# Patient Record
Sex: FEMALE | URBAN - METROPOLITAN AREA
[De-identification: names, ages, dates, MRNs, and addresses within clinical notes are randomized per-mention and may not be internally consistent; named-entity substitution may affect disease eponyms.]

---

## 2023-04-20 ENCOUNTER — OFFICE VISIT (OUTPATIENT)
Dept: URBAN - METROPOLITAN AREA CLINIC 92 | Facility: CLINIC | Age: 68
End: 2023-04-20

## 2023-04-21 ENCOUNTER — WEB ENCOUNTER (OUTPATIENT)
Dept: URBAN - METROPOLITAN AREA CLINIC 92 | Facility: CLINIC | Age: 68
End: 2023-04-21

## 2023-04-27 ENCOUNTER — OFFICE VISIT (OUTPATIENT)
Dept: URBAN - METROPOLITAN AREA CLINIC 92 | Facility: CLINIC | Age: 68
End: 2023-04-27

## 2023-04-27 RX ORDER — CHOLECALCIFEROL (VITAMIN D3) 25 MCG
TAKE 1 TABLET (1,000 UNITS TOTAL) BY MOUTH EVERY DAY TABLET ORAL
Qty: 90 EACH | Refills: 1 | Status: ACTIVE | COMMUNITY

## 2023-04-27 RX ORDER — LAMOTRIGINE 200 MG/1
TABLET ORAL
Qty: 180 TABLET | Status: ACTIVE | COMMUNITY

## 2023-06-27 ENCOUNTER — CLAIMS CREATED FROM THE CLAIM WINDOW (OUTPATIENT)
Dept: URBAN - METROPOLITAN AREA CLINIC 92 | Facility: CLINIC | Age: 68
End: 2023-06-27
Payer: COMMERCIAL

## 2023-06-27 ENCOUNTER — LAB OUTSIDE AN ENCOUNTER (OUTPATIENT)
Dept: URBAN - METROPOLITAN AREA CLINIC 92 | Facility: CLINIC | Age: 68
End: 2023-06-27

## 2023-06-27 VITALS
TEMPERATURE: 97 F | SYSTOLIC BLOOD PRESSURE: 131 MMHG | BODY MASS INDEX: 32.96 KG/M2 | HEIGHT: 67 IN | WEIGHT: 210 LBS | HEART RATE: 70 BPM | DIASTOLIC BLOOD PRESSURE: 80 MMHG

## 2023-06-27 DIAGNOSIS — G40.909 SEIZURE DISORDER: ICD-10-CM

## 2023-06-27 DIAGNOSIS — Z86.19 HISTORY OF HEPATITIS C: ICD-10-CM

## 2023-06-27 DIAGNOSIS — Z12.11 COLON CANCER SCREENING: ICD-10-CM

## 2023-06-27 DIAGNOSIS — Z86.19 HISTORY OF HEPATITIS B: ICD-10-CM

## 2023-06-27 PROBLEM — 128613002: Status: ACTIVE | Noted: 2023-06-27

## 2023-06-27 PROCEDURE — 99203 OFFICE O/P NEW LOW 30 MIN: CPT | Performed by: INTERNAL MEDICINE

## 2023-06-27 PROCEDURE — 993 AGA: Performed by: INTERNAL MEDICINE

## 2023-06-27 RX ORDER — LAMOTRIGINE 200 MG/1
TABLET ORAL
Qty: 180 TABLET | COMMUNITY

## 2023-06-27 RX ORDER — POLYETHYLENE GLYCOL 3350, SODIUM CHLORIDE, SODIUM BICARBONATE, POTASSIUM CHLORIDE 420; 11.2; 5.72; 1.48 G/4L; G/4L; G/4L; G/4L
4000 ML POWDER, FOR SOLUTION ORAL AS DIRECTED
Qty: 4000 ML | Refills: 0 | OUTPATIENT
Start: 2023-06-27 | End: 2023-06-28

## 2023-06-27 RX ORDER — ONDANSETRON 4 MG/1
2 TABLETS TABLET, FILM COATED ORAL
Qty: 2 TABLETS | Refills: 0 | OUTPATIENT
Start: 2023-06-27

## 2023-06-27 RX ORDER — CHOLECALCIFEROL (VITAMIN D3) 25 MCG
TAKE 1 TABLET (1,000 UNITS TOTAL) BY MOUTH EVERY DAY TABLET ORAL
Qty: 90 EACH | Refills: 1 | COMMUNITY

## 2023-06-27 NOTE — HPI-TODAY'S VISIT:
66 yo fem ref by Dr Khushi Garcia for a gi consultation for colon ca screening and a copy will be sent to the ref provider. Pt has never done had a colonoscopy done. Pt denies constipation- goes twice a day. Pt has Seizure disorder- every 3-5 years has one, sees neuro. Pt was treated for Hep C- by another doctor.

## 2023-07-07 ENCOUNTER — LAB OUTSIDE AN ENCOUNTER (OUTPATIENT)
Dept: URBAN - METROPOLITAN AREA CLINIC 86 | Facility: CLINIC | Age: 68
End: 2023-07-07

## 2023-07-07 ENCOUNTER — OFFICE VISIT (OUTPATIENT)
Dept: URBAN - METROPOLITAN AREA CLINIC 86 | Facility: CLINIC | Age: 68
End: 2023-07-07
Payer: COMMERCIAL

## 2023-07-07 VITALS
SYSTOLIC BLOOD PRESSURE: 124 MMHG | WEIGHT: 210 LBS | DIASTOLIC BLOOD PRESSURE: 80 MMHG | HEIGHT: 67 IN | BODY MASS INDEX: 32.96 KG/M2 | TEMPERATURE: 96.8 F | HEART RATE: 71 BPM

## 2023-07-07 DIAGNOSIS — Z79.899 HIGH RISK MEDICATION USE: ICD-10-CM

## 2023-07-07 DIAGNOSIS — B19.20 HEPATITIS C VIRUS INFECTION WITHOUT HEPATIC COMA, UNSPECIFIED CHRONICITY: ICD-10-CM

## 2023-07-07 PROBLEM — 50711007: Status: ACTIVE | Noted: 2023-07-07

## 2023-07-07 PROBLEM — 453861000124107: Status: ACTIVE | Noted: 2023-07-07

## 2023-07-07 PROBLEM — 443913008: Status: ACTIVE | Noted: 2023-07-07

## 2023-07-07 PROCEDURE — 99214 OFFICE O/P EST MOD 30 MIN: CPT | Performed by: PHYSICIAN ASSISTANT

## 2023-07-07 RX ORDER — ONDANSETRON 4 MG/1
2 TABLETS TABLET, FILM COATED ORAL
Qty: 2 TABLETS | Refills: 0 | Status: ACTIVE | COMMUNITY
Start: 2023-06-27

## 2023-07-07 RX ORDER — LAMOTRIGINE 200 MG/1
TABLET ORAL
Qty: 180 TABLET | COMMUNITY

## 2023-07-07 RX ORDER — CHOLECALCIFEROL (VITAMIN D3) 25 MCG
TAKE 1 TABLET (1,000 UNITS TOTAL) BY MOUTH EVERY DAY TABLET ORAL
Qty: 90 EACH | Refills: 1 | COMMUNITY

## 2023-07-07 NOTE — HPI-TODAY'S VISIT:
This is a 68 yo female previously seen by Dr. Kiera Black and referred by Dr. Khushi Montana presents for evaluation of history of hepatitis C. A copy of the note will be sent to the referring provider.  7/7/23 office visit  She was treated 10 years ago. She took [place] and was told she was in SVR.  She had an ultrasound done at the time and it was normal. She notes she was also told she had hep b but has not had this checked again

## 2023-07-12 ENCOUNTER — TELEPHONE ENCOUNTER (OUTPATIENT)
Dept: URBAN - METROPOLITAN AREA CLINIC 86 | Facility: CLINIC | Age: 68
End: 2023-07-12

## 2023-07-12 LAB
A/G RATIO: 1.6
ALBUMIN: 4.8
ALKALINE PHOSPHATASE: 71
ALT (SGPT): 10
AST (SGOT): 17
BASO (ABSOLUTE): 0
BASOS: 0
BILIRUBIN, TOTAL: 0.6
BUN/CREATININE RATIO: 12
BUN: 12
CALCIUM: 9.9
CARBON DIOXIDE, TOTAL: 21
CHLORIDE: 103
CREATININE: 1
EGFR: 63
EOS (ABSOLUTE): 0
EOS: 0
GLOBULIN, TOTAL: 3
GLUCOSE: 99
HBSAG SCREEN: NEGATIVE
HBV LOG10: (no result)
HCV LOG10: (no result)
HEMATOCRIT: 39.7
HEMATOLOGY COMMENTS:: (no result)
HEMOGLOBIN: 13.4
HEP A AB, TOTAL: POSITIVE
HEP B CORE AB, TOT: NEGATIVE
HEPATITIS B QUANTITATION: (no result)
HEPATITIS B SURF AB QUANT: <3.1
HEPATITIS C QUANTITATION: (no result)
IMMATURE CELLS: (no result)
IMMATURE GRANS (ABS): 0
IMMATURE GRANULOCYTES: 0
LYMPHS (ABSOLUTE): 1.5
LYMPHS: 45
MCH: 31.2
MCHC: 33.8
MCV: 92
MONOCYTES(ABSOLUTE): 0.4
MONOCYTES: 11
NEUTROPHILS (ABSOLUTE): 1.5
NEUTROPHILS: 44
NRBC: (no result)
PLATELETS: 327
POTASSIUM: 5.3
PROTEIN, TOTAL: 7.8
RBC: 4.3
RDW: 12.7
SODIUM: 141
TEST INFORMATION:: (no result)
TEST INFORMATION:: (no result)
WBC: 3.5

## 2023-07-12 NOTE — HPI-TODAY'S VISIT:
Dear Gisella Koo,   The July 7 labs were sent to me.  The hepatitis C virus was not detected.  Hepatitis B immunity was not seen.  Glucose 99, creatinine 1.0, sodium 141, potassium 5.3 and this is slightly elevated and sometimes this can happen if the blood sample is shaken but I would still recommend rechecking this with the primary care.  The bilirubin 0.6, alkaline phosphatase 71, AST 17, ALT 10.  Goal for the AST and ALT is less than 25.  The hepatitis B virus is not detected.  The complete blood count was normal.  The hepatitis B core was not detected either.  You have immunity to hepatitis A.  Overall there is no evidence of any virus impacting the liver, but they did not see any immunity to the hepatitis B virus and I would recommend discussing this vaccine with the primary care.  We will see what the ultrasound shows and discuss this at the follow-up.  Estrella Andrews PA-C

## 2023-08-03 ENCOUNTER — OFFICE VISIT (OUTPATIENT)
Dept: URBAN - METROPOLITAN AREA CLINIC 16 | Facility: CLINIC | Age: 68
End: 2023-08-03
Payer: COMMERCIAL

## 2023-08-03 DIAGNOSIS — R93.2 ABNORMAL ULTRASOUND OF LIVER: ICD-10-CM

## 2023-08-03 PROCEDURE — 76705 ECHO EXAM OF ABDOMEN: CPT

## 2023-08-03 PROCEDURE — 93975 VASCULAR STUDY: CPT

## 2023-08-15 ENCOUNTER — TELEPHONE ENCOUNTER (OUTPATIENT)
Dept: URBAN - METROPOLITAN AREA CLINIC 86 | Facility: CLINIC | Age: 68
End: 2023-08-15

## 2023-08-15 NOTE — HPI-TODAY'S VISIT:
Dear Gisella Koo,  The August 3 ultrasound was sent to me.  The liver appeared increased in echogenicity but they did not see any lesions.  The gallbladder appeared normal.  Common bile duct was 3 mm and this is normal.  The hepatic vasculature was patent.  Overall they thought that the echogenicity was increased which can be seen in fatty liver and chronic liver disease.  We will review this at the follow up.  Estrella Andrews PA-C

## 2023-08-31 ENCOUNTER — OFFICE VISIT (OUTPATIENT)
Dept: URBAN - METROPOLITAN AREA CLINIC 86 | Facility: CLINIC | Age: 68
End: 2023-08-31
Payer: COMMERCIAL

## 2023-08-31 VITALS
BODY MASS INDEX: 32.18 KG/M2 | WEIGHT: 205 LBS | SYSTOLIC BLOOD PRESSURE: 121 MMHG | TEMPERATURE: 96.8 F | HEIGHT: 67 IN | HEART RATE: 73 BPM | DIASTOLIC BLOOD PRESSURE: 72 MMHG

## 2023-08-31 DIAGNOSIS — B19.20 HEPATITIS C VIRUS INFECTION WITHOUT HEPATIC COMA, UNSPECIFIED CHRONICITY: ICD-10-CM

## 2023-08-31 DIAGNOSIS — Z79.899 HIGH RISK MEDICATION USE: ICD-10-CM

## 2023-08-31 DIAGNOSIS — K76.0 FATTY LIVER: ICD-10-CM

## 2023-08-31 DIAGNOSIS — Z78.9 HEPATITIS B CORE ANTIBODY NEGATIVE: ICD-10-CM

## 2023-08-31 DIAGNOSIS — Z71.85 VACCINE COUNSELING: ICD-10-CM

## 2023-08-31 PROBLEM — 197321007: Status: ACTIVE | Noted: 2023-08-31

## 2023-08-31 PROCEDURE — 99214 OFFICE O/P EST MOD 30 MIN: CPT | Performed by: PHYSICIAN ASSISTANT

## 2023-08-31 RX ORDER — ONDANSETRON 4 MG/1
2 TABLETS TABLET, FILM COATED ORAL
Qty: 2 TABLETS | Refills: 0 | Status: ACTIVE | COMMUNITY
Start: 2023-06-27

## 2023-08-31 RX ORDER — LAMOTRIGINE 200 MG/1
TABLET ORAL
Qty: 180 TABLET | COMMUNITY

## 2023-08-31 RX ORDER — CHOLECALCIFEROL (VITAMIN D3) 25 MCG
TAKE 1 TABLET (1,000 UNITS TOTAL) BY MOUTH EVERY DAY TABLET ORAL
Qty: 90 EACH | Refills: 1 | COMMUNITY

## 2023-08-31 NOTE — HPI-TODAY'S VISIT:
This is a 68 yo female previously seen by Dr. Kiera Black and referred by Dr. Khushi Montana presents for evaluation of history of hepatitis C. A copy of the note will be sent to the referring provider.  8/31/23 ov 8/3/23 us The August 3 ultrasound was sent to me.  The liver appeared increased in echogenicity but they did not see any lesions.  The gallbladder appeared normal.  Common bile duct was 3 mm and this is normal.  The hepatic vasculature was patent.  Overall they thought that the echogenicity was increased which can be seen in fatty liver and chronic liver disease.  We will review this at the follow up. 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 at last visit she was 210 and happy to see this she is walking and cut out sugar.  7/7/23 labs  The hepatitis C virus was not detected.  Hepatitis B immunity was not seen.  Glucose 99, creatinine 1.0, sodium 141,   The bilirubin 0.6, alkaline phosphatase 71, AST 17, ALT 10.  Goal for the AST and ALT is less than 25.  The hepatitis B virus is not detected.  The complete blood count was normal.  The hepatitis B core was not detected either.  You have immunity to hepatitis A.    7/7/23 office visit  She was treated 10 years ago. She took [place] and was told she was in SVR.  She had an ultrasound done at the time and it was normal. She notes she was also told she had hep b but has not had this checked again

## 2024-01-29 ENCOUNTER — LAB OUTSIDE AN ENCOUNTER (OUTPATIENT)
Dept: URBAN - METROPOLITAN AREA CLINIC 86 | Facility: CLINIC | Age: 69
End: 2024-01-29

## 2024-01-30 ENCOUNTER — OFFICE VISIT (OUTPATIENT)
Dept: URBAN - METROPOLITAN AREA CLINIC 91 | Facility: CLINIC | Age: 69
End: 2024-01-30

## 2024-01-30 ENCOUNTER — OFFICE VISIT (OUTPATIENT)
Dept: URBAN - METROPOLITAN AREA CLINIC 86 | Facility: CLINIC | Age: 69
End: 2024-01-30

## 2024-02-05 ENCOUNTER — LAB (OUTPATIENT)
Dept: URBAN - METROPOLITAN AREA CLINIC 86 | Facility: CLINIC | Age: 69
End: 2024-02-05

## 2024-02-06 ENCOUNTER — US W/ DOPP (OUTPATIENT)
Dept: URBAN - METROPOLITAN AREA CLINIC 91 | Facility: CLINIC | Age: 69
End: 2024-02-06

## 2024-02-06 ENCOUNTER — OV EP (OUTPATIENT)
Dept: URBAN - METROPOLITAN AREA CLINIC 86 | Facility: CLINIC | Age: 69
End: 2024-02-06
Payer: COMMERCIAL

## 2024-02-06 VITALS
HEIGHT: 67 IN | WEIGHT: 212 LBS | TEMPERATURE: 97.2 F | SYSTOLIC BLOOD PRESSURE: 112 MMHG | BODY MASS INDEX: 33.27 KG/M2 | HEART RATE: 68 BPM | DIASTOLIC BLOOD PRESSURE: 71 MMHG

## 2024-02-06 DIAGNOSIS — K76.0 FATTY LIVER: ICD-10-CM

## 2024-02-06 DIAGNOSIS — Z79.899 HIGH RISK MEDICATION USE: ICD-10-CM

## 2024-02-06 DIAGNOSIS — Z78.9 HEPATITIS B CORE ANTIBODY NEGATIVE: ICD-10-CM

## 2024-02-06 DIAGNOSIS — B19.20 HEPATITIS C VIRUS INFECTION WITHOUT HEPATIC COMA, UNSPECIFIED CHRONICITY: ICD-10-CM

## 2024-02-06 DIAGNOSIS — Z71.85 VACCINE COUNSELING: ICD-10-CM

## 2024-02-06 PROBLEM — 736693005: Status: ACTIVE | Noted: 2024-02-06

## 2024-02-06 PROCEDURE — 99214 OFFICE O/P EST MOD 30 MIN: CPT | Performed by: PHYSICIAN ASSISTANT

## 2024-02-06 RX ORDER — ONDANSETRON 4 MG/1
2 TABLETS TABLET, FILM COATED ORAL
Qty: 2 TABLETS | Refills: 0 | Status: ON HOLD | COMMUNITY
Start: 2023-06-27

## 2024-02-06 RX ORDER — CHOLECALCIFEROL (VITAMIN D3) 25 MCG
TAKE 1 TABLET (1,000 UNITS TOTAL) BY MOUTH EVERY DAY TABLET ORAL
Qty: 90 EACH | Refills: 1 | Status: ACTIVE | COMMUNITY

## 2024-02-06 RX ORDER — ROSUVASTATIN CALCIUM 5 MG/1
1 TABLET TABLET, FILM COATED ORAL ONCE A DAY
Status: ACTIVE | COMMUNITY

## 2024-02-06 RX ORDER — LAMOTRIGINE 200 MG/1
TABLET ORAL
Qty: 180 TABLET | Status: ACTIVE | COMMUNITY

## 2024-02-06 NOTE — HPI-TODAY'S VISIT:
This is a 68 yo female previously seen by Dr. Kiera Black and referred by Dr. Khushi Montana presents for evaluation of history of hepatitis C. A copy of the note will be sent to the referring provider.   2/6/24 ov  She was to do the US today but had to r/s because technician was sick. We will get her rescheduled.  SHe has not done the labs. We can order those today.  Did see labs in [facility] October 30, 2023 labs with white blood cells 4.8, hemoglobin 12.7, MCV 94, platelets 328 and this is normal.  Sodium 139, potassium 4.2, creatinine 0.9, bilirubin 0.5, AST 23, ALT 10. Her weight is up and discussed this. 212, and previously 205.  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   recap 8/31/23 ov 8/3/23 us The August 3 ultrasound was sent to me.  The liver appeared increased in echogenicity but they did not see any lesions.  The gallbladder appeared normal.  Common bile duct was 3 mm and this is normal.  The hepatic vasculature was patent.  Overall they thought that the echogenicity was increased which can be seen in fatty liver and chronic liver disease.  We will review this at the follow up. 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 at last visit she was 210 and happy to see this she is walking and cut out sugar.  7/7/23 labs  The hepatitis C virus was not detected.  Hepatitis B immunity was not seen.  Glucose 99, creatinine 1.0, sodium 141,   The bilirubin 0.6, alkaline phosphatase 71, AST 17, ALT 10.  Goal for the AST and ALT is less than 25.  The hepatitis B virus is not detected.  The complete blood count was normal.  The hepatitis B core was not detected either.  You have immunity to hepatitis A.    7/7/23 office visit  She was treated 10 years ago. She took [place] and was told she was in SVR.  She had an ultrasound done at the time and it was normal. She notes she was also told she had hep b but has not had this checked again

## 2024-02-06 NOTE — PHYSICAL EXAM SKIN:
Problem: Pain, Acute (Adult)  Goal: Identify Related Risk Factors and Signs and Symptoms  Outcome: Ongoing (interventions implemented as appropriate)    02/04/17 2214   Pain, Acute   Related Risk Factors (Acute Pain) infection   Signs and Symptoms (Acute Pain) BADLs/IADLs reluctance/inability to perform;verbalization of pain descriptors       Goal: Acceptable Pain Control/Comfort Level  Outcome: Ongoing (interventions implemented as appropriate)    Problem: Cellulitis (Adult)  Goal: Signs and Symptoms of Listed Potential Problems Will be Absent or Manageable (Cellulitis)  Outcome: Ongoing (interventions implemented as appropriate)    02/05/17 1022   Cellulitis   Problems Assessed (Cellulitis) all   Problems Present (Cellulitis) pain         Problem: Patient Care Overview (Adult)  Goal: Plan of Care Review  Outcome: Ongoing (interventions implemented as appropriate)    02/05/17 1022   Coping/Psychosocial Response Interventions   Plan Of Care Reviewed With patient   Patient Care Overview   Progress progress toward functional goals as expected       Goal: Adult Individualization and Mutuality  Outcome: Ongoing (interventions implemented as appropriate)  Goal: Discharge Needs Assessment  Outcome: Ongoing (interventions implemented as appropriate)       no rashes

## 2024-02-09 LAB
A/G RATIO: 1.6
ALBUMIN: 4.6
ALKALINE PHOSPHATASE: 52
ALT (SGPT): 13
AST (SGOT): 25
BASO (ABSOLUTE): 0
BASOS: 1
BILIRUBIN, TOTAL: 0.5
BUN/CREATININE RATIO: 13
BUN: 14
CALCIUM: 9.8
CARBON DIOXIDE, TOTAL: 24
CHLORIDE: 101
CREATININE: 1.06
EGFR: 58
EOS (ABSOLUTE): 0
EOS: 0
GLOBULIN, TOTAL: 2.9
GLUCOSE: 91
HCV LOG10: (no result)
HEMATOCRIT: 37.8
HEMATOLOGY COMMENTS:: (no result)
HEMOGLOBIN: 12.6
HEPATITIS C QUANTITATION: (no result)
IMMATURE CELLS: (no result)
IMMATURE GRANS (ABS): 0
IMMATURE GRANULOCYTES: 0
LYMPHS (ABSOLUTE): 1.7
LYMPHS: 44
MCH: 30.6
MCHC: 33.3
MCV: 92
MONOCYTES(ABSOLUTE): 0.4
MONOCYTES: 10
NEUTROPHILS (ABSOLUTE): 1.7
NEUTROPHILS: 45
NRBC: (no result)
PLATELETS: 315
POTASSIUM: 4.8
PROTEIN, TOTAL: 7.5
RBC: 4.12
RDW: 12.9
SODIUM: 141
TEST INFORMATION:: (no result)
WBC: 3.7

## 2024-02-23 ENCOUNTER — US W/ DOPP (OUTPATIENT)
Dept: URBAN - METROPOLITAN AREA CLINIC 91 | Facility: CLINIC | Age: 69
End: 2024-02-23
Payer: COMMERCIAL

## 2024-02-23 DIAGNOSIS — B17.10 HEPATITIS C: ICD-10-CM

## 2024-02-23 PROCEDURE — 76705 ECHO EXAM OF ABDOMEN: CPT

## 2024-02-23 PROCEDURE — 93975 VASCULAR STUDY: CPT

## 2024-08-01 ENCOUNTER — LAB OUTSIDE AN ENCOUNTER (OUTPATIENT)
Dept: URBAN - METROPOLITAN AREA CLINIC 86 | Facility: CLINIC | Age: 69
End: 2024-08-01

## 2024-08-07 ENCOUNTER — LAB OUTSIDE AN ENCOUNTER (OUTPATIENT)
Dept: URBAN - METROPOLITAN AREA CLINIC 86 | Facility: CLINIC | Age: 69
End: 2024-08-07

## 2024-08-23 ENCOUNTER — OFFICE VISIT (OUTPATIENT)
Dept: URBAN - METROPOLITAN AREA CLINIC 91 | Facility: CLINIC | Age: 69
End: 2024-08-23
Payer: COMMERCIAL

## 2024-08-23 ENCOUNTER — OFFICE VISIT (OUTPATIENT)
Dept: URBAN - METROPOLITAN AREA CLINIC 86 | Facility: CLINIC | Age: 69
End: 2024-08-23
Payer: COMMERCIAL

## 2024-08-23 VITALS
BODY MASS INDEX: 33.74 KG/M2 | DIASTOLIC BLOOD PRESSURE: 69 MMHG | WEIGHT: 215 LBS | TEMPERATURE: 97.1 F | HEIGHT: 67 IN | HEART RATE: 69 BPM | SYSTOLIC BLOOD PRESSURE: 113 MMHG

## 2024-08-23 DIAGNOSIS — B19.20 HEPATITIS C VIRUS INFECTION WITHOUT HEPATIC COMA, UNSPECIFIED CHRONICITY: ICD-10-CM

## 2024-08-23 DIAGNOSIS — Z79.899 HIGH RISK MEDICATION USE: ICD-10-CM

## 2024-08-23 DIAGNOSIS — K76.0 FATTY (CHANGE OF) LIVER: ICD-10-CM

## 2024-08-23 DIAGNOSIS — Z78.9 HEPATITIS B CORE ANTIBODY NEGATIVE: ICD-10-CM

## 2024-08-23 DIAGNOSIS — K76.0 FATTY LIVER: ICD-10-CM

## 2024-08-23 DIAGNOSIS — B18.2 HEP C W/O COMA, CHRONIC: ICD-10-CM

## 2024-08-23 PROCEDURE — 99214 OFFICE O/P EST MOD 30 MIN: CPT | Performed by: PHYSICIAN ASSISTANT

## 2024-08-23 PROCEDURE — 76705 ECHO EXAM OF ABDOMEN: CPT

## 2024-08-23 PROCEDURE — 93975 VASCULAR STUDY: CPT

## 2024-08-23 RX ORDER — CHOLECALCIFEROL (VITAMIN D3) 25 MCG
TAKE 1 TABLET (1,000 UNITS TOTAL) BY MOUTH EVERY DAY TABLET ORAL
Qty: 90 EACH | Refills: 1 | Status: ACTIVE | COMMUNITY

## 2024-08-23 RX ORDER — ONDANSETRON 4 MG/1
2 TABLETS TABLET, FILM COATED ORAL
Qty: 2 TABLETS | Refills: 0 | Status: ON HOLD | COMMUNITY
Start: 2023-06-27

## 2024-08-23 RX ORDER — LAMOTRIGINE 200 MG/1
TABLET ORAL
Qty: 180 TABLET | Status: ACTIVE | COMMUNITY

## 2024-08-23 RX ORDER — ROSUVASTATIN CALCIUM 5 MG/1
1 TABLET TABLET, FILM COATED ORAL ONCE A DAY
Status: ACTIVE | COMMUNITY

## 2024-08-23 NOTE — HPI-TODAY'S VISIT:
This is a 66 yo female previously seen by Dr. Kiera Black and referred by Dr. Khushi Montana presents for evaluation of history of hepatitis C. A copy of the note will be sent to the referring provider.  8/23/24 She is doing the us next and will follow up on the report  she had labs in march again and all normal.  sees her primary next week and wants to tod the labs with them doing well overall     recap The 2/23/24 ultrasound was sent to me. The liver was normal in echogenicity and they did not see any lesions. They did not see any dilated intrahepatic biliary varices. The pancreas normal were seen. Right kidney 10.9 cm and appears normal. Spleen 8.6 mm. The hepatic vascular patent. Overall they thought that the liver appeared normal and the hepatic vasculature was patent which is good to note.  Estrella Andrews PA-C  Dear Gisella Koo, The recent February 6 labs were sent to me.  The hepatitis C was not detected.  The creatinine slightly elevated 1.06, questions with your primary care.  Previously back in July of this year has been normal.  Sodium 141, potassium 4.8, bilirubin 0.5, alkaline phosphatase 52, AST 25, ALT 13.  Goal for the AST and ALT is less than 25.  To this is borderline.  Be sure to work on her diet and exercise and this will help with the labs.  Complete blood count was normal.  The white blood cells 3.7, hemoglobin 12.6, MCV 92, platelets 215.  It was good seeing you the other day. Estrella Andrews PA-C  2/6/24 ov  She was to do the US today but had to r/s because technician was sick. We will get her rescheduled.  SHe has not done the labs. We can order those today.  Did see labs in [facility] October 30, 2023 labs with white blood cells 4.8, hemoglobin 12.7, MCV 94, platelets 328 and this is normal.  Sodium 139, potassium 4.2, creatinine 0.9, bilirubin 0.5, AST 23, ALT 10. Her weight is up and discussed this. 212, and previously 205.  She said her sister had hip surgery and was caring for her in [place] x 3 weeks and having bad food and that could have contributed to the weight.   recap 8/31/23 ov 8/3/23 us The August 3 ultrasound was sent to me.  The liver appeared increased in echogenicity but they did not see any lesions.  The gallbladder appeared normal.  Common bile duct was 3 mm and this is normal.  The hepatic vasculature was patent.  Overall they thought that the echogenicity was increased which can be seen in fatty liver and chronic liver disease.  We will review this at the follow up. 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 at last visit she was 210 and happy to see this she is walking and cut out sugar.  7/7/23 labs  The hepatitis C virus was not detected.  Hepatitis B immunity was not seen.  Glucose 99, creatinine 1.0, sodium 141,   The bilirubin 0.6, alkaline phosphatase 71, AST 17, ALT 10.  Goal for the AST and ALT is less than 25.  The hepatitis B virus is not detected.  The complete blood count was normal.  The hepatitis B core was not detected either.  You have immunity to hepatitis A.    7/7/23 office visit  She was treated 10 years ago. She took [place] and was told she was in SVR.  She had an ultrasound done at the time and it was normal. She notes she was also told she had hep b but has not had this checked again

## 2024-08-28 ENCOUNTER — TELEPHONE ENCOUNTER (OUTPATIENT)
Dept: URBAN - METROPOLITAN AREA CLINIC 86 | Facility: CLINIC | Age: 69
End: 2024-08-28

## 2024-09-18 ENCOUNTER — LAB OUTSIDE AN ENCOUNTER (OUTPATIENT)
Dept: URBAN - METROPOLITAN AREA CLINIC 92 | Facility: CLINIC | Age: 69
End: 2024-09-18

## 2024-09-18 ENCOUNTER — OFFICE VISIT (OUTPATIENT)
Dept: URBAN - METROPOLITAN AREA CLINIC 92 | Facility: CLINIC | Age: 69
End: 2024-09-18
Payer: COMMERCIAL

## 2024-09-18 ENCOUNTER — DASHBOARD ENCOUNTERS (OUTPATIENT)
Age: 69
End: 2024-09-18

## 2024-09-18 VITALS
DIASTOLIC BLOOD PRESSURE: 86 MMHG | BODY MASS INDEX: 34.01 KG/M2 | TEMPERATURE: 97.9 F | HEIGHT: 67 IN | WEIGHT: 216.7 LBS | HEART RATE: 68 BPM | SYSTOLIC BLOOD PRESSURE: 112 MMHG

## 2024-09-18 DIAGNOSIS — Z12.11 COLON CANCER SCREENING: ICD-10-CM

## 2024-09-18 PROCEDURE — 99202 OFFICE O/P NEW SF 15 MIN: CPT | Performed by: INTERNAL MEDICINE

## 2024-09-18 PROCEDURE — 99212 OFFICE O/P EST SF 10 MIN: CPT | Performed by: INTERNAL MEDICINE

## 2024-09-18 RX ORDER — CHOLECALCIFEROL (VITAMIN D3) 25 MCG
TAKE 1 TABLET (1,000 UNITS TOTAL) BY MOUTH EVERY DAY TABLET ORAL
Qty: 90 EACH | Refills: 1 | Status: ACTIVE | COMMUNITY

## 2024-09-18 RX ORDER — ROSUVASTATIN CALCIUM 5 MG/1
1 TABLET TABLET, FILM COATED ORAL ONCE A DAY
Status: ACTIVE | COMMUNITY

## 2024-09-18 RX ORDER — ONDANSETRON 4 MG/1
2 TABLETS TABLET, FILM COATED ORAL
Qty: 2 TABLETS | Refills: 0 | Status: ACTIVE | COMMUNITY
Start: 2023-06-27

## 2024-09-18 RX ORDER — LAMOTRIGINE 200 MG/1
TABLET ORAL
Qty: 180 TABLET | Status: ACTIVE | COMMUNITY

## 2024-09-18 NOTE — HPI-TODAY'S VISIT:
68yF with a hx of seizures (last in Mar 2024), HCV s/p tx and SVR, who p/w CRC screen. No e/o cirrhosis on imaging, follows with Liver Team. No prior colon, no blood in stool, no fam hx of CRC.

## 2024-09-20 ENCOUNTER — TELEPHONE ENCOUNTER (OUTPATIENT)
Dept: URBAN - METROPOLITAN AREA CLINIC 92 | Facility: CLINIC | Age: 69
End: 2024-09-20

## 2024-09-20 RX ORDER — POLYETHYLENE GLYCOL-3350 AND ELECTROLYTES WITH FLAVOR PACK 240; 5.84; 2.98; 6.72; 22.72 G/278.26G; G/278.26G; G/278.26G; G/278.26G; G/278.26G
4000 ML POWDER, FOR SOLUTION ORAL ONCE
Qty: 4000 ML | Refills: 0 | OUTPATIENT
Start: 2024-09-20

## 2024-11-07 ENCOUNTER — OFFICE VISIT (OUTPATIENT)
Dept: URBAN - METROPOLITAN AREA SURGERY CENTER 16 | Facility: SURGERY CENTER | Age: 69
End: 2024-11-07
Payer: COMMERCIAL

## 2024-11-07 DIAGNOSIS — Z12.11 COLON CANCER SCREENING: ICD-10-CM

## 2024-11-07 DIAGNOSIS — Z12.11 COLON CANCER SCREENING (HIGH RISK): ICD-10-CM

## 2024-11-07 DIAGNOSIS — K57.30 DIVERTICULOSIS OF SIGMOID COLON: ICD-10-CM

## 2024-11-07 DIAGNOSIS — K64.8 HEMORRHOIDS, INTERNAL. NON BLEEDING,: ICD-10-CM

## 2024-11-07 PROCEDURE — 0528F RCMND FLW-UP 10 YRS DOCD: CPT | Performed by: INTERNAL MEDICINE

## 2024-11-07 PROCEDURE — G0121 COLON CA SCRN NOT HI RSK IND: HCPCS | Performed by: INTERNAL MEDICINE

## 2024-11-07 PROCEDURE — 00812 ANES LWR INTST SCR COLSC: CPT | Performed by: ANESTHESIOLOGY

## 2024-11-07 PROCEDURE — 00812 ANES LWR INTST SCR COLSC: CPT | Performed by: ANESTHESIOLOGIST ASSISTANT

## 2024-11-07 RX ORDER — LAMOTRIGINE 200 MG/1
TABLET ORAL
Qty: 180 TABLET | Status: ACTIVE | COMMUNITY

## 2024-11-07 RX ORDER — CHOLECALCIFEROL (VITAMIN D3) 25 MCG
TAKE 1 TABLET (1,000 UNITS TOTAL) BY MOUTH EVERY DAY TABLET ORAL
Qty: 90 EACH | Refills: 1 | Status: ACTIVE | COMMUNITY

## 2024-11-07 RX ORDER — ROSUVASTATIN CALCIUM 5 MG/1
1 TABLET TABLET, FILM COATED ORAL ONCE A DAY
Status: ACTIVE | COMMUNITY

## 2024-11-07 RX ORDER — POLYETHYLENE GLYCOL-3350 AND ELECTROLYTES WITH FLAVOR PACK 240; 5.84; 2.98; 6.72; 22.72 G/278.26G; G/278.26G; G/278.26G; G/278.26G; G/278.26G
4000 ML POWDER, FOR SOLUTION ORAL ONCE
Qty: 4000 ML | Refills: 0 | Status: ACTIVE | COMMUNITY
Start: 2024-09-20

## 2024-11-07 RX ORDER — ONDANSETRON 4 MG/1
2 TABLETS TABLET, FILM COATED ORAL
Qty: 2 TABLETS | Refills: 0 | Status: ACTIVE | COMMUNITY
Start: 2023-06-27

## 2025-02-03 ENCOUNTER — LAB OUTSIDE AN ENCOUNTER (OUTPATIENT)
Dept: URBAN - METROPOLITAN AREA CLINIC 86 | Facility: CLINIC | Age: 70
End: 2025-02-03

## 2025-02-24 ENCOUNTER — OFFICE VISIT (OUTPATIENT)
Dept: URBAN - METROPOLITAN AREA CLINIC 86 | Facility: CLINIC | Age: 70
End: 2025-02-24
Payer: COMMERCIAL

## 2025-02-24 ENCOUNTER — OFFICE VISIT (OUTPATIENT)
Dept: URBAN - METROPOLITAN AREA CLINIC 91 | Facility: CLINIC | Age: 70
End: 2025-02-24
Payer: COMMERCIAL

## 2025-02-24 VITALS
WEIGHT: 218 LBS | TEMPERATURE: 97.2 F | BODY MASS INDEX: 34.21 KG/M2 | DIASTOLIC BLOOD PRESSURE: 77 MMHG | HEIGHT: 67 IN | HEART RATE: 69 BPM | SYSTOLIC BLOOD PRESSURE: 126 MMHG

## 2025-02-24 DIAGNOSIS — B19.20 HEPATITIS C VIRUS INFECTION WITHOUT HEPATIC COMA, UNSPECIFIED CHRONICITY: ICD-10-CM

## 2025-02-24 DIAGNOSIS — Z71.85 VACCINE COUNSELING: ICD-10-CM

## 2025-02-24 DIAGNOSIS — Z78.9 HEPATITIS B CORE ANTIBODY NEGATIVE: ICD-10-CM

## 2025-02-24 DIAGNOSIS — K76.0 FATTY LIVER: ICD-10-CM

## 2025-02-24 DIAGNOSIS — Z79.899 HIGH RISK MEDICATION USE: ICD-10-CM

## 2025-02-24 PROCEDURE — 99214 OFFICE O/P EST MOD 30 MIN: CPT | Performed by: PHYSICIAN ASSISTANT

## 2025-02-24 PROCEDURE — 76705 ECHO EXAM OF ABDOMEN: CPT

## 2025-02-24 PROCEDURE — 93975 VASCULAR STUDY: CPT

## 2025-02-24 RX ORDER — MAGNESIUM 200 MG
2 TABLETS WITH A MEAL TABLET ORAL ONCE A DAY
Status: ACTIVE | COMMUNITY

## 2025-02-24 RX ORDER — HYDROCHLOROTHIAZIDE 12.5 MG/1
1 CAPSULE IN THE MORNING CAPSULE, GELATIN COATED ORAL ONCE A DAY
Status: ACTIVE | COMMUNITY

## 2025-02-24 RX ORDER — ROSUVASTATIN CALCIUM 5 MG/1
1 TABLET TABLET, FILM COATED ORAL ONCE A DAY
Status: ON HOLD | COMMUNITY

## 2025-02-24 RX ORDER — LAMOTRIGINE 200 MG/1
TABLET ORAL
Qty: 180 TABLET | Status: ACTIVE | COMMUNITY

## 2025-02-24 RX ORDER — CHOLECALCIFEROL (VITAMIN D3) 25 MCG
TAKE 1 TABLET (1,000 UNITS TOTAL) BY MOUTH EVERY DAY TABLET ORAL
Qty: 90 EACH | Refills: 1 | Status: ACTIVE | COMMUNITY

## 2025-02-24 RX ORDER — ONDANSETRON 4 MG/1
2 TABLETS TABLET, FILM COATED ORAL
Qty: 2 TABLETS | Refills: 0 | Status: ACTIVE | COMMUNITY
Start: 2023-06-27

## 2025-02-24 RX ORDER — POLYETHYLENE GLYCOL-3350 AND ELECTROLYTES WITH FLAVOR PACK 240; 5.84; 2.98; 6.72; 22.72 G/278.26G; G/278.26G; G/278.26G; G/278.26G; G/278.26G
4000 ML POWDER, FOR SOLUTION ORAL ONCE
Qty: 4000 ML | Refills: 0 | Status: ACTIVE | COMMUNITY
Start: 2024-09-20

## 2025-02-24 RX ORDER — ROSUVASTATIN CALCIUM 5 MG/1
1 TABLET TABLET, COATED ORAL ONCE A DAY
Status: ACTIVE | COMMUNITY

## 2025-02-24 NOTE — HPI-TODAY'S VISIT:
This is a 66 yo female previously seen by Dr. Kiera Black and referred by Dr. Khushi Montana presents for evaluation of history of hepatitis C. A copy of the note will be sent to the referring provider.    2/24/25 she had US today and preliminary showing normal liver  need to check the labs  she notes her seizures    recap Dear Gisella Koo,   The recent 8/23/24 ultrasound was sent to me.  The liver appeared homogeneous in echotexture and low-density lesions.  The common bile duct was 4 mm and this was normal.  The gallbladder appeared normal.  The right kidney also appeared normal.  The hepatic vasculature appeared patent the spleen 7.5 cm and this is normal.  Overall they felt this is a normal ultrasound which is good to note.  Estrella Andrews PA-C  8/23/24 She is doing the us next and will follow up on the report  she had labs in march again and all normal.  sees her primary next week and wants to tod the labs with them doing well overall     recap The 2/23/24 ultrasound was sent to me. The liver was normal in echogenicity and they did not see any lesions. They did not see any dilated intrahepatic biliary varices. The pancreas normal were seen. Right kidney 10.9 cm and appears normal. Spleen 8.6 mm. The hepatic vascular patent. Overall they thought that the liver appeared normal and the hepatic vasculature was patent which is good to note.  Estrelal Andrews PA-C  Dear Gisella Koo, The recent February 6 labs were sent to me.  The hepatitis C was not detected.  The creatinine slightly elevated 1.06, questions with your primary care.  Previously back in July of this year has been normal.  Sodium 141, potassium 4.8, bilirubin 0.5, alkaline phosphatase 52, AST 25, ALT 13.  Goal for the AST and ALT is less than 25.  To this is borderline.  Be sure to work on her diet and exercise and this will help with the labs.  Complete blood count was normal.  The white blood cells 3.7, hemoglobin 12.6, MCV 92, platelets 215.  It was good seeing you the other day. Estrella Andrews PA-C  2/6/24 ov  She was to do the US today but had to r/s because technician was sick. We will get her rescheduled.  SHe has not done the labs. We can order those today.  Did see labs in [facility] October 30, 2023 labs with white blood cells 4.8, hemoglobin 12.7, MCV 94, platelets 328 and this is normal.  Sodium 139, potassium 4.2, creatinine 0.9, bilirubin 0.5, AST 23, ALT 10. Her weight is up and discussed this. 212, and previously 205.  She said her sister had hip surgery and was caring for her in [place] x 3 weeks and having bad food and that could have contributed to the weight.   recap 8/31/23 ov 8/3/23 us The August 3 ultrasound was sent to me.  The liver appeared increased in echogenicity but they did not see any lesions.  The gallbladder appeared normal.  Common bile duct was 3 mm and this is normal.  The hepatic vasculature was patent.  Overall they thought that the echogenicity was increased which can be seen in fatty liver and chronic liver disease.  We will review this at the follow up. 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 at last visit she was 210 and happy to see this she is walking and cut out sugar.  7/7/23 labs  The hepatitis C virus was not detected.  Hepatitis B immunity was not seen.  Glucose 99, creatinine 1.0, sodium 141,   The bilirubin 0.6, alkaline phosphatase 71, AST 17, ALT 10.  Goal for the AST and ALT is less than 25.  The hepatitis B virus is not detected.  The complete blood count was normal.  The hepatitis B core was not detected either.  You have immunity to hepatitis A.    7/7/23 office visit  She was treated 10 years ago. She took [place] and was told she was in SVR.  She had an ultrasound done at the time and it was normal. She notes she was also told she had hep b but has not had this checked again

## 2025-02-25 ENCOUNTER — TELEPHONE ENCOUNTER (OUTPATIENT)
Dept: URBAN - METROPOLITAN AREA CLINIC 86 | Facility: CLINIC | Age: 70
End: 2025-02-25

## 2025-02-25 NOTE — HPI-TODAY'S VISIT:
Gisella Koo,  The final ultrasound was sent to me.  The liver appeared normal in contour and echotexture.  They did not see lesions.  Gallbladder and bile ducts normal.  The right kidney normal.  Spleen about 7.9 cm this is normal.  Happy this is stable. Estrella Andrews PA-C
no
None

## 2025-08-04 ENCOUNTER — LAB OUTSIDE AN ENCOUNTER (OUTPATIENT)
Dept: URBAN - METROPOLITAN AREA CLINIC 86 | Facility: CLINIC | Age: 70
End: 2025-08-04

## 2025-08-15 ENCOUNTER — OFFICE VISIT (OUTPATIENT)
Dept: URBAN - METROPOLITAN AREA CLINIC 86 | Facility: CLINIC | Age: 70
End: 2025-08-15
Payer: MEDICARE

## 2025-08-15 ENCOUNTER — OFFICE VISIT (OUTPATIENT)
Dept: URBAN - METROPOLITAN AREA CLINIC 91 | Facility: CLINIC | Age: 70
End: 2025-08-15
Payer: MEDICARE

## 2025-08-15 DIAGNOSIS — B19.20 HEPATITIS C VIRUS INFECTION WITHOUT HEPATIC COMA, UNSPECIFIED CHRONICITY: ICD-10-CM

## 2025-08-15 DIAGNOSIS — Z79.899 HIGH RISK MEDICATION USE: ICD-10-CM

## 2025-08-15 DIAGNOSIS — Z71.85 VACCINE COUNSELING: ICD-10-CM

## 2025-08-15 DIAGNOSIS — K76.0 FATTY LIVER: ICD-10-CM

## 2025-08-15 DIAGNOSIS — Z78.9 HEPATITIS B CORE ANTIBODY NEGATIVE: ICD-10-CM

## 2025-08-15 PROCEDURE — 76705 ECHO EXAM OF ABDOMEN: CPT

## 2025-08-15 PROCEDURE — 93975 VASCULAR STUDY: CPT

## 2025-08-15 PROCEDURE — 99214 OFFICE O/P EST MOD 30 MIN: CPT | Performed by: PHYSICIAN ASSISTANT

## 2025-08-15 RX ORDER — ONDANSETRON 4 MG/1
2 TABLETS TABLET, FILM COATED ORAL
Qty: 2 TABLETS | Refills: 0 | Status: ACTIVE | COMMUNITY
Start: 2023-06-27

## 2025-08-15 RX ORDER — MAGNESIUM 200 MG
2 TABLETS WITH A MEAL TABLET ORAL ONCE A DAY
Status: ACTIVE | COMMUNITY

## 2025-08-15 RX ORDER — ROSUVASTATIN CALCIUM 5 MG/1
1 TABLET TABLET, COATED ORAL ONCE A DAY
Status: ACTIVE | COMMUNITY

## 2025-08-15 RX ORDER — HYDROCHLOROTHIAZIDE 12.5 MG/1
1 CAPSULE IN THE MORNING CAPSULE, GELATIN COATED ORAL ONCE A DAY
Status: ACTIVE | COMMUNITY

## 2025-08-15 RX ORDER — POLYETHYLENE GLYCOL-3350 AND ELECTROLYTES WITH FLAVOR PACK 240; 5.84; 2.98; 6.72; 22.72 G/278.26G; G/278.26G; G/278.26G; G/278.26G; G/278.26G
4000 ML POWDER, FOR SOLUTION ORAL ONCE
Qty: 4000 ML | Refills: 0 | Status: ACTIVE | COMMUNITY
Start: 2024-09-20

## 2025-08-15 RX ORDER — ROSUVASTATIN CALCIUM 5 MG/1
1 TABLET TABLET, FILM COATED ORAL ONCE A DAY
Status: ON HOLD | COMMUNITY

## 2025-08-15 RX ORDER — CHOLECALCIFEROL (VITAMIN D3) 25 MCG
TAKE 1 TABLET (1,000 UNITS TOTAL) BY MOUTH EVERY DAY TABLET ORAL
Qty: 90 EACH | Refills: 1 | Status: ACTIVE | COMMUNITY

## 2025-08-15 RX ORDER — LAMOTRIGINE 200 MG/1
TABLET ORAL
Qty: 180 TABLET | Status: ACTIVE | COMMUNITY